# Patient Record
Sex: MALE | Race: WHITE | Employment: UNEMPLOYED | URBAN - METROPOLITAN AREA
[De-identification: names, ages, dates, MRNs, and addresses within clinical notes are randomized per-mention and may not be internally consistent; named-entity substitution may affect disease eponyms.]

---

## 2019-09-30 ENCOUNTER — OFFICE VISIT (OUTPATIENT)
Dept: FAMILY MEDICINE CLINIC | Age: 24
End: 2019-09-30
Payer: COMMERCIAL

## 2019-09-30 VITALS
BODY MASS INDEX: 31.32 KG/M2 | WEIGHT: 218.8 LBS | HEIGHT: 70 IN | SYSTOLIC BLOOD PRESSURE: 134 MMHG | DIASTOLIC BLOOD PRESSURE: 84 MMHG

## 2019-09-30 DIAGNOSIS — N39.0 COMPLICATED URINARY TRACT INFECTION: ICD-10-CM

## 2019-09-30 DIAGNOSIS — Z72.51 HIGH RISK HETEROSEXUAL BEHAVIOR: ICD-10-CM

## 2019-09-30 DIAGNOSIS — Z00.00 WELL ADULT EXAM: Primary | ICD-10-CM

## 2019-09-30 DIAGNOSIS — Z00.00 WELL ADULT EXAM: ICD-10-CM

## 2019-09-30 DIAGNOSIS — N48.9 LESION OF PENIS: ICD-10-CM

## 2019-09-30 DIAGNOSIS — E66.9 CLASS 1 OBESITY WITHOUT SERIOUS COMORBIDITY WITH BODY MASS INDEX (BMI) OF 31.0 TO 31.9 IN ADULT, UNSPECIFIED OBESITY TYPE: ICD-10-CM

## 2019-09-30 LAB
A/G RATIO: 1.6 (ref 1.1–2.2)
ALBUMIN SERPL-MCNC: 4.6 G/DL (ref 3.4–5)
ALP BLD-CCNC: 91 U/L (ref 40–129)
ALT SERPL-CCNC: 35 U/L (ref 10–40)
ANION GAP SERPL CALCULATED.3IONS-SCNC: 15 MMOL/L (ref 3–16)
AST SERPL-CCNC: 18 U/L (ref 15–37)
BILIRUB SERPL-MCNC: 0.7 MG/DL (ref 0–1)
BILIRUBIN, POC: NORMAL
BLOOD URINE, POC: NORMAL
BUN BLDV-MCNC: 11 MG/DL (ref 7–20)
CALCIUM SERPL-MCNC: 9.7 MG/DL (ref 8.3–10.6)
CHLORIDE BLD-SCNC: 101 MMOL/L (ref 99–110)
CLARITY, POC: NORMAL
CO2: 26 MMOL/L (ref 21–32)
COLOR, POC: YELLOW
CREAT SERPL-MCNC: 1 MG/DL (ref 0.9–1.3)
GFR AFRICAN AMERICAN: >60
GFR NON-AFRICAN AMERICAN: >60
GLOBULIN: 2.9 G/DL
GLUCOSE BLD-MCNC: 106 MG/DL (ref 70–99)
GLUCOSE URINE, POC: NORMAL
HCT VFR BLD CALC: 47.6 % (ref 40.5–52.5)
HEMOGLOBIN: 16.5 G/DL (ref 13.5–17.5)
KETONES, POC: NORMAL
LEUKOCYTE EST, POC: NORMAL
MCH RBC QN AUTO: 30.2 PG (ref 26–34)
MCHC RBC AUTO-ENTMCNC: 34.6 G/DL (ref 31–36)
MCV RBC AUTO: 87.2 FL (ref 80–100)
NITRITE, POC: NORMAL
PDW BLD-RTO: 13.9 % (ref 12.4–15.4)
PH, POC: 6
PLATELET # BLD: 258 K/UL (ref 135–450)
PMV BLD AUTO: 9.6 FL (ref 5–10.5)
POTASSIUM SERPL-SCNC: 4.2 MMOL/L (ref 3.5–5.1)
PROTEIN, POC: NORMAL
RBC # BLD: 5.46 M/UL (ref 4.2–5.9)
SODIUM BLD-SCNC: 142 MMOL/L (ref 136–145)
SPECIFIC GRAVITY, POC: 1.01
TOTAL PROTEIN: 7.5 G/DL (ref 6.4–8.2)
TOTAL SYPHILLIS IGG/IGM: NORMAL
UROBILINOGEN, POC: 0.2
WBC # BLD: 8.3 K/UL (ref 4–11)

## 2019-09-30 PROCEDURE — 99204 OFFICE O/P NEW MOD 45 MIN: CPT | Performed by: FAMILY MEDICINE

## 2019-09-30 PROCEDURE — 81002 URINALYSIS NONAUTO W/O SCOPE: CPT | Performed by: FAMILY MEDICINE

## 2019-09-30 RX ORDER — CEPHALEXIN 500 MG/1
500 CAPSULE ORAL 4 TIMES DAILY
COMMUNITY

## 2019-09-30 SDOH — HEALTH STABILITY: MENTAL HEALTH: HOW MANY STANDARD DRINKS CONTAINING ALCOHOL DO YOU HAVE ON A TYPICAL DAY?: 3 OR 4

## 2019-09-30 SDOH — HEALTH STABILITY: MENTAL HEALTH: HOW OFTEN DO YOU HAVE A DRINK CONTAINING ALCOHOL?: 2-4 TIMES A MONTH

## 2019-09-30 ASSESSMENT — ENCOUNTER SYMPTOMS
NAUSEA: 0
VOMITING: 0
CHEST TIGHTNESS: 0
DIARRHEA: 0
SORE THROAT: 0
SHORTNESS OF BREATH: 0
RHINORRHEA: 0
COUGH: 0
WHEEZING: 0
ABDOMINAL PAIN: 0
SINUS PRESSURE: 0

## 2019-09-30 ASSESSMENT — PATIENT HEALTH QUESTIONNAIRE - PHQ9
SUM OF ALL RESPONSES TO PHQ QUESTIONS 1-9: 0
2. FEELING DOWN, DEPRESSED OR HOPELESS: 0
SUM OF ALL RESPONSES TO PHQ9 QUESTIONS 1 & 2: 0
1. LITTLE INTEREST OR PLEASURE IN DOING THINGS: 0
SUM OF ALL RESPONSES TO PHQ QUESTIONS 1-9: 0

## 2019-09-30 NOTE — PROGRESS NOTES
on file   Social History Narrative    Not on file        History reviewed. No pertinent family history. Vitals:    09/30/19 0806 09/30/19 0828   BP: (!) 130/90 134/84   Site:  Left Upper Arm   Position:  Sitting   Cuff Size:  Medium Adult   Weight: 218 lb 12.8 oz (99.2 kg)    Height: 5' 10\" (1.778 m)      Estimated body mass index is 31.39 kg/m² as calculated from the following:    Height as of this encounter: 5' 10\" (1.778 m). Weight as of this encounter: 218 lb 12.8 oz (99.2 kg). Chemistry    No results found for: NA, K, CL, CO2, BUN, CREATININE No results found for: CALCIUM, ALKPHOS, AST, ALT, BILITOT       No results found for: WBC, HGB, HCT, MCV, PLT  No results found for: LABA1C  No results found for: EAG  No results found for: LABA1C  No components found for: CHLPL  No results found for: TRIG  No results found for: HDL  No results found for: LDLCALC  No results found for: LABVLDL    Physical Exam   Constitutional: He is oriented to person, place, and time. He appears well-developed and well-nourished. HENT:   Head: Normocephalic and atraumatic. Right Ear: External ear normal.   Left Ear: External ear normal.   Nose: Nose normal.   Mouth/Throat: Oropharynx is clear and moist.   Eyes: Pupils are equal, round, and reactive to light. Conjunctivae are normal.   Neck: Neck supple. No thyromegaly present. Cardiovascular: Normal rate, regular rhythm and normal heart sounds. No murmur heard. Pulmonary/Chest: Effort normal and breath sounds normal. He has no wheezes. He has no rales. Abdominal: Soft. Bowel sounds are normal. There is no tenderness. Genitourinary: No penile tenderness. Genitourinary Comments: 3-4 mm lesion on left side of shaft of penis  Flesh color   Has white head after manipulation  No erythema  No pain with palpation   Musculoskeletal: Normal range of motion. He exhibits no edema. Lymphadenopathy:     He has no cervical adenopathy.    Neurological: He is alert and oriented to person, place, and time. He displays normal reflexes. No cranial nerve deficit. Skin: No rash noted. Psychiatric: He has a normal mood and affect. His behavior is normal. Judgment and thought content normal.       ASSESSMENT/PLAN:  1. Well adult exam  Care established  Medications reviewed  Questions answered  Labs obtained  RTC in one year for follow up. Discussed need for vaccinations,   Patient agreed to consider these, has fear of needles  - CBC; Future  - Comprehensive Metabolic Panel; Future  - HIV Screen; Future  - C.trachomatis N.gonorrhoeae DNA, Urine; Future  - Syphilis Antibody Cascading Reflex; Future  - HERPES SIMPLEX VIRUS (HSV) II GLYCOPROTEIN ANTIBODY IGG REFLEX; Future    2. Lesion of penis  Will obtain STD testing  Will refer to Dermatology for evaluation and treatment. Questions answered during the visit. - CBC; Future  - Comprehensive Metabolic Panel; Future  - HIV Screen; Future  - C.trachomatis N.gonorrhoeae DNA, Urine; Future  - Syphilis Antibody Cascading Reflex; Future  - HERPES SIMPLEX VIRUS (HSV) II GLYCOPROTEIN ANTIBODY IGG REFLEX; Future    3. Complicated urinary tract infection  UA was wnl  Reassured the patient  Answered questions. - POCT Urinalysis no Micro    4. Class 1 obesity without serious comorbidity with body mass index (BMI) of 31.0 to 31.9 in adult, unspecified obesity type  Discussed the need to exercise 30 minutes each day. Monitor diet and push water. Reduce refined carbs and replace with fiber and vegetables. Decrease portion size and limit snacking, stop eating after dinner  Count calories. 5. High risk heterosexual behavior  Patient educated on need for testing  Will have this obtained  Answered questions  Continue with condom use  Will rtc in one year sooner if needed. - CBC; Future  - Comprehensive Metabolic Panel; Future  - HIV Screen; Future  - C.trachomatis N.gonorrhoeae DNA, Urine;  Future  - Syphilis Antibody Cascading Reflex;

## 2019-10-01 LAB
C. TRACHOMATIS DNA ,URINE: NEGATIVE
N. GONORRHOEAE DNA, URINE: NEGATIVE

## 2019-10-02 LAB — HSV 2 GLYCOPROTEIN G AB IGG: 0.28 IV

## 2020-10-16 ENCOUNTER — TELEPHONE (OUTPATIENT)
Dept: FAMILY MEDICINE CLINIC | Age: 25
End: 2020-10-16

## 2020-10-16 NOTE — TELEPHONE ENCOUNTER
Pt called to report what antibiotic he was previously on for prior UTI.  Cephalexin     [phone] pt phone

## 2020-10-16 NOTE — TELEPHONE ENCOUNTER
I called the patient and informed him how important it was that he have a UA obtained to determine if this is the cause. I recommended coming into the clinic for an evaluation but he stated he wasn't able to do that. I educated him on the importance of having a UA and recommended being evaluated at a quick care. He stated he understood.

## 2020-10-16 NOTE — TELEPHONE ENCOUNTER
I have previously told the patient that it was very important to have UA obtained. He understood at the time.

## 2020-10-16 NOTE — TELEPHONE ENCOUNTER
Patient is calling stating he thinks he has a UTI he is having discomfort urinating.  Patient states symptoms started on Monday he wanted to know if  would be willing to write him a rx please advise     [facility]- [address] [place]